# Patient Record
Sex: FEMALE | Race: WHITE | NOT HISPANIC OR LATINO | Employment: OTHER | URBAN - NONMETROPOLITAN AREA
[De-identification: names, ages, dates, MRNs, and addresses within clinical notes are randomized per-mention and may not be internally consistent; named-entity substitution may affect disease eponyms.]

---

## 2023-04-27 ENCOUNTER — OFFICE VISIT (OUTPATIENT)
Dept: URGENT CARE | Facility: CLINIC | Age: 88
End: 2023-04-27

## 2023-04-27 VITALS
OXYGEN SATURATION: 98 % | TEMPERATURE: 98 F | RESPIRATION RATE: 17 BRPM | HEART RATE: 95 BPM | SYSTOLIC BLOOD PRESSURE: 118 MMHG | DIASTOLIC BLOOD PRESSURE: 68 MMHG

## 2023-04-27 DIAGNOSIS — B96.89 ACUTE BACTERIAL BRONCHITIS: Primary | ICD-10-CM

## 2023-04-27 DIAGNOSIS — R05.1 ACUTE COUGH: ICD-10-CM

## 2023-04-27 DIAGNOSIS — J20.8 ACUTE BACTERIAL BRONCHITIS: Primary | ICD-10-CM

## 2023-04-27 LAB
SARS-COV-2 AG UPPER RESP QL IA: NEGATIVE
VALID CONTROL: NORMAL

## 2023-04-27 RX ORDER — FUROSEMIDE 80 MG
TABLET ORAL
COMMUNITY
Start: 2023-03-27

## 2023-04-27 RX ORDER — POTASSIUM CHLORIDE 1500 MG/1
20 TABLET, EXTENDED RELEASE ORAL DAILY
COMMUNITY
Start: 2023-02-02

## 2023-04-27 RX ORDER — AZITHROMYCIN 250 MG/1
TABLET, FILM COATED ORAL
Qty: 6 TABLET | Refills: 0 | Status: SHIPPED | OUTPATIENT
Start: 2023-04-27 | End: 2023-05-01

## 2023-04-27 RX ORDER — AMOXICILLIN 500 MG/1
CAPSULE ORAL
COMMUNITY
Start: 2023-04-18

## 2023-04-27 RX ORDER — ALPRAZOLAM 0.5 MG/1
0.5 TABLET ORAL 2 TIMES DAILY PRN
COMMUNITY
Start: 2023-04-27

## 2023-04-27 RX ORDER — TRAMADOL HYDROCHLORIDE 50 MG/1
TABLET ORAL
COMMUNITY
Start: 2023-03-27

## 2023-04-27 RX ORDER — AMLODIPINE BESYLATE 5 MG/1
5 TABLET ORAL DAILY
COMMUNITY
Start: 2023-02-10

## 2023-04-27 RX ORDER — LEVOTHYROXINE SODIUM 0.12 MG/1
125 TABLET ORAL DAILY
COMMUNITY
Start: 2023-02-20

## 2023-04-27 RX ORDER — METOPROLOL SUCCINATE 50 MG/1
50 TABLET, EXTENDED RELEASE ORAL DAILY
COMMUNITY
Start: 2023-02-10

## 2023-04-27 NOTE — PATIENT INSTRUCTIONS
Stop Amoxicillin  Start Zithromax  Drink plenty of fluids  If symptoms worsen go to the ER for further evaluation

## 2023-04-27 NOTE — PROGRESS NOTES
[facility]        NAME: Kenny Lundberg is a 80 y o  female  : 1935    MRN: [id number]  DATE: 2023  TIME: 3:54 PM    Assessment and Plan   Acute bacterial bronchitis [J20 8, B96 89]  1  Acute bacterial bronchitis  azithromycin (ZITHROMAX) 250 mg tablet      2  Acute cough  Poct Covid 19 Rapid Antigen Test            Patient Instructions       Follow up with PCP in 3-5 days  Proceed to  ER if symptoms worsen  Chief Complaint     Chief Complaint   Patient presents with   • Cough   • Fatigue   • Chills   • sinus congestion   • sinus drainage     Started 14 days ago, coughing productive, and productive, sinus congestion, and sinus drainage  PCP prescribed amoxicillin, and started last Tuesday   Reports poor po intake  OTC advil cold and sinus         History of Present Illness       Patient presents with a 2 week hx of sinus drainage, productive cough and fatigue  Was prescribed Amoxicillin by PCP which has not helped  Cough up thick green phlegm  Deneis CP or SOB  Review of Systems   Review of Systems   Constitutional: Positive for chills and fever  HENT: Positive for congestion and rhinorrhea  Negative for sore throat  Respiratory: Positive for cough  Negative for shortness of breath  Cardiovascular: Negative for chest pain  Gastrointestinal: Negative for diarrhea, nausea and vomiting  Current Medications       Current Outpatient Medications:   •  ALPRAZolam (XANAX) 0 5 mg tablet, Take 0 5 mg by mouth 2 (two) times a day as needed, Disp: , Rfl:   •  amLODIPine (NORVASC) 5 mg tablet, Take 5 mg by mouth daily, Disp: , Rfl:   •  amoxicillin (AMOXIL) 500 mg capsule, TAKE 1 CAPSULE BY MOUTH THREE TIMES A DAY FOR 10 DAYS, Disp: , Rfl:   •  azithromycin (ZITHROMAX) 250 mg tablet, Take 2 tablets today then 1 tablet daily x 4 days, Disp: 6 tablet, Rfl: 0  •  furosemide (LASIX) 80 mg tablet, TAKE 1/2-1 TABLET (40-80 MG TOTAL) BY MOUTH DAILY  , Disp: , Rfl:   •  Klor-Con M20 20 MEQ tablet, Take 20 mEq by mouth daily, Disp: , Rfl:   •  levothyroxine 125 mcg tablet, Take 125 mcg by mouth daily, Disp: , Rfl:   •  metoprolol succinate (TOPROL-XL) 50 mg 24 hr tablet, Take 50 mg by mouth daily, Disp: , Rfl:   •  traMADol (ULTRAM) 50 mg tablet, TAKE 1 TABLET BY MOUTH EVERY 6 HOURS AS NEEDED (PAIN)  , Disp: , Rfl:     Current Allergies     Allergies as of 04/27/2023 - Reviewed 04/27/2023   Allergen Reaction Noted   • Levofloxacin Other (See Comments) 12/15/2014   • Other Other (See Comments) 12/15/2014   • Sulfa antibiotics GI Intolerance 12/15/2014            The following portions of the patient's history were reviewed and updated as appropriate: allergies, current medications, past family history, past medical history, past social history, past surgical history and problem list      Past Medical History:   Diagnosis Date   • Arthritis    • Hypertension    • Hypothyroidism        Past Surgical History:   Procedure Laterality Date   • CHOLECYSTECTOMY     • HYSTERECTOMY         No family history on file  Medications have been verified  Objective   /68   Pulse 95   Temp 98 °F (36 7 °C)   Resp 17   SpO2 98%   No LMP recorded  Physical Exam     Physical Exam  Vitals and nursing note reviewed  Constitutional:       Appearance: Normal appearance  HENT:      Head: Normocephalic and atraumatic  Right Ear: Tympanic membrane normal       Left Ear: Tympanic membrane normal       Mouth/Throat:      Mouth: Mucous membranes are moist       Pharynx: Oropharynx is clear  Eyes:      Conjunctiva/sclera: Conjunctivae normal    Cardiovascular:      Rate and Rhythm: Normal rate and regular rhythm  Heart sounds: Normal heart sounds  Pulmonary:      Effort: Pulmonary effort is normal       Breath sounds: Normal breath sounds  Musculoskeletal:      Cervical back: Neck supple  Lymphadenopathy:      Cervical: No cervical adenopathy  Skin:     General: Skin is warm  Neurological:      Mental Status: She is alert